# Patient Record
Sex: FEMALE | Race: WHITE | URBAN - METROPOLITAN AREA
[De-identification: names, ages, dates, MRNs, and addresses within clinical notes are randomized per-mention and may not be internally consistent; named-entity substitution may affect disease eponyms.]

---

## 2017-03-13 ENCOUNTER — TRANSFERRED RECORDS (OUTPATIENT)
Dept: HEALTH INFORMATION MANAGEMENT | Facility: CLINIC | Age: 77
End: 2017-03-13

## 2017-03-14 ENCOUNTER — PRE VISIT (OUTPATIENT)
Dept: OPHTHALMOLOGY | Facility: CLINIC | Age: 77
End: 2017-03-14

## 2017-03-14 RX ORDER — IBANDRONATE SODIUM 150 MG/1
150 TABLET, FILM COATED ORAL
COMMUNITY

## 2017-03-14 RX ORDER — PREDNISOLONE ACETATE 10 MG/ML
1 SUSPENSION/ DROPS OPHTHALMIC 4 TIMES DAILY
Refills: 1 | COMMUNITY
Start: 2017-01-04 | End: 2017-05-03

## 2017-03-14 RX ORDER — TRAMADOL HYDROCHLORIDE 50 MG/1
1 TABLET ORAL DAILY
Refills: 0 | COMMUNITY
Start: 2017-03-01 | End: 2017-04-10

## 2017-03-14 NOTE — TELEPHONE ENCOUNTER
Consult Referred by Dr Hernandez  For the evaluation of   Chief Complaint   Patient presents with     Previsit     appt w/ Dr Oglesby     Dermatochalasis Evaluation     referred by Dr Hernandez     Ectropion Evaluation     referred by Dr Hernandez            HPI:  Location:   OU     Symptoms:   POSITIVE for: and dermatochalasis and ectropian OU   Pertinent Negatives:   Negative for vision changes or eye problems  Severity:   moderate  Duration:   years    Timing:   gradual onset  Other:     POH:  1- Dry eye  2- Dermatochalasis  3- Ectropion     Ocular Meds:  Pred forte 1%  (Prednisilone), Xiidra, Refresh PM gel       ROS:    Review Of Systems  Eyes: positive as above, dermatochalasis and ectropian OU   Endocrine: positive for positive for     Allergies:    Allergies   Allergen Reactions     Erythromycin Nausea     Oral only     Penicillins Nausea     Tetracycline Nausea       Systemic Medications:   Current Outpatient Prescriptions   Medication     omeprazole (PRILOSEC) 20 MG CR capsule     traMADol (ULTRAM) 50 MG tablet     prednisoLONE acetate (PRED FORTE) 1 % ophthalmic susp     Lifitegrast (XIIDRA) 5 % SOLN     Carboxymethylcellul-Glycerin (REFRESH OPTIVE) 1-0.9 % GEL     aspirin 81 MG tablet     Cholecalciferol (VITAMIN D PO)     Ascorbic Acid (VITAMIN C PO)     CALCIUM PO     IBANdronate (BONIVA) 150 MG tablet     Docusate Sodium (COLACE PO)     No current facility-administered medications for this visit.        Family History   Problem Relation Age of Onset     Brain Cancer Mother      Breast Cancer Sister      DIABETES Sister      Glaucoma No family hx of      Macular Degeneration No family hx of      Eye Surgery No family hx of      Retinal detachment No family hx of      Nystagmus No family hx of        Social History   Substance Use Topics     Smoking status: Current Some Day Smoker     Packs/day: 0.50     Types: Cigarettes     Smokeless tobacco: Not on file      Comment: maybe 3 per day     Alcohol use No        Rubia SAUL.

## 2017-03-15 ENCOUNTER — OFFICE VISIT (OUTPATIENT)
Dept: OPHTHALMOLOGY | Facility: CLINIC | Age: 77
End: 2017-03-15
Payer: COMMERCIAL

## 2017-03-15 ENCOUNTER — TELEPHONE (OUTPATIENT)
Dept: OPHTHALMOLOGY | Facility: CLINIC | Age: 77
End: 2017-03-15

## 2017-03-15 DIAGNOSIS — H02.836 DERMATOCHALASIS OF EYELIDS OF BOTH EYES: Primary | ICD-10-CM

## 2017-03-15 DIAGNOSIS — H16.213 EXPOSURE KERATOCONJUNCTIVITIS, BILATERAL: ICD-10-CM

## 2017-03-15 DIAGNOSIS — H02.133: ICD-10-CM

## 2017-03-15 DIAGNOSIS — H02.136 SENILE ECTROPION, LEFT: ICD-10-CM

## 2017-03-15 DIAGNOSIS — H02.423 MYOGENIC PTOSIS, BILATERAL: ICD-10-CM

## 2017-03-15 DIAGNOSIS — H02.833 DERMATOCHALASIS OF EYELIDS OF BOTH EYES: Primary | ICD-10-CM

## 2017-03-15 PROCEDURE — 92285 EXTERNAL OCULAR PHOTOGRAPHY: CPT | Performed by: OPHTHALMOLOGY

## 2017-03-15 PROCEDURE — 99203 OFFICE O/P NEW LOW 30 MIN: CPT | Performed by: OPHTHALMOLOGY

## 2017-03-15 PROCEDURE — 92081 LIMITED VISUAL FIELD XM: CPT | Performed by: OPHTHALMOLOGY

## 2017-03-15 ASSESSMENT — TONOMETRY
OD_IOP_MMHG: 14
IOP_METHOD: ICARE
OS_IOP_MMHG: 12

## 2017-03-15 ASSESSMENT — VISUAL ACUITY
CORRECTION_TYPE: GLASSES
OS_CC: 20/25
OD_CC: 20/25
METHOD: SNELLEN - LINEAR

## 2017-03-15 ASSESSMENT — MARGIN REFLEX DISTANCE
OS_MRD1: 1
OD_MRD1: 2

## 2017-03-15 ASSESSMENT — LAGOPHTHALMOS
OD_LAGOPHTHALMOS: 0
OS_LAGOPHTHALMOS: 0

## 2017-03-15 ASSESSMENT — LEVATOR FUNCTION
OS_LEVATOR: 13
OD_LEVATOR: 13

## 2017-03-15 NOTE — LETTER
3/15/2017         RE:  :  MRN: Jayna Gutierrez  1940  [id number]     Dear Dr. Hernandez,    Thank you for allow me to see your patient, Jayna Gutierrez, for an oculoplastic consultation.  My assessment and plan are below.  For further details, please see my attached clinic note.               HPI:     Jayna Gutierrez is a 77 year old female who has noted gradual onset of droopy eyelids over the past years. The droopy eyelid is interfering with activities of daily living including driving, and reading. Additionally she has severe dry eyes, and she is using numerous drops, including artificial tears, ointment at night, steroid drops, and recently Xiidra. She is extremely light sensitive because of the dry eye. When she takes a shower she cannot run water on her face because it gets into her eye as she cannot squeeze the lower lids enough. The patient denies double vision, variability of the eyelid position.     EXAM:     MRD1: 2/1  Dermatochalasis with excess skin touching eyelashes Significant Myogenic ptosis.   Lower eyelid laxity, with poor apposition of the lower eyelid to the globe, left worse than right.       VISUAL FIELD:  Right eye untaped:10 degrees Right eye taped:45 degrees  Left eye untaped:10 degrees Left eye taped:60 degrees    Assessment & Plan     Jayna Gutierrez is a 77 year old female with the following diagnoses:   1. Dermatochalasis of eyelids of both eyes    2. Myogenic ptosis, bilateral    3. Senile ectropion, left    4. Senile ectropion of right eye    5. Exposure keratoconjunctivitis, bilateral           Both upper eyelid blepharoplasty  and Bilateral ptosis repair external levator approach (conservative given dry eye), both lower eyelid ectropion repair (tarsal strip).      ANTICOAGULATION:    Can hold prior to surgery: ASA        Again, thank you for allowing me to participate in the care of your patient.      Sincerely,    Poornima Oglesby MD  Department of Ophthalmology and  Visual Neurosciences  [facility]    CC: Laurence Hernandez  [facility]  [address] [facility] [address]  VIA Mail

## 2017-03-15 NOTE — PROGRESS NOTES
Oculoplastic Clinic New Patient    Patient: Jayna Gutierrez MRN# [id number]   YOB: 1940 Age: 77 year old   Date of Visit: Mar 15, 2017    CC: Droopy eyelids obstructing vision.  Chief Complaints and History of Present Illnesses   Patient presents with     Dermatochalasis Evaluation     refered by Dr Hernandez                 HPI:     Jayna Gutierrez is a 77 year old female who has noted gradual onset of droopy eyelids over the past years. The droopy eyelid is interfering with activities of daily living including driving, and reading. Additionally she has severe dry eyes, and she is using numerous drops, including artificial tears, ointment at night, steroid drops, and recently Xiidra. She is extremely light sensitive because of the dry eye. When she takes a shower she cannot run water on her face because it gets into her eye as she cannot squeeze the lower lids enough. The patient denies double vision, variability of the eyelid position.     EXAM:     MRD1: 2/1  Dermatochalasis with excess skin touching eyelashes Significant Myogenic ptosis.   Lower eyelid laxity, with poor apposition of the lower eyelid to the globe, left worse than right.       VISUAL FIELD:  Right eye untaped:10 degrees Right eye taped:45 degrees  Left eye untaped:10 degrees Left eye taped:60 degrees    Assessment & Plan     Jayna Gutierrez is a 77 year old female with the following diagnoses:   1. Dermatochalasis of eyelids of both eyes    2. Myogenic ptosis, bilateral    3. Senile ectropion, left    4. Senile ectropion of right eye    5. Exposure keratoconjunctivitis, bilateral           Both upper eyelid blepharoplasty  and Bilateral ptosis repair external levator approach (conservative given dry eye), both lower eyelid ectropion repair (tarsal strip).      ANTICOAGULATION:    Can hold prior to surgery: ASA         PHOTOS DEMONSTRATE:    Significant dermatochalasis with lids resting on eyelashes and obstructing visual  axis  Myogenic blepharoptosis  Both lower eyelid ectropion contributing to her exposure keratoconjunctivitis.     Complete documentation of historical and exam elements from today's encounter can be found in the full encounter summary report (not reduplicated in this progress note). I personally obtained the chief complaint(s) and history of present illness.  I confirmed and edited as necessary the review of systems, past medical/surgical history, family history, social history, and examination findings as documented by others; and I examined the patient myself. I personally reviewed the relevant tests, images, and reports as documented above. I formulated and edited as necessary the assessment and plan and discussed the findings and management plan with the patient and family. - Poornima Oglesby MD      Today with Jayna Gutierrez, I reviewed the indications, risks, benefits, and alternatives of the proposed surgical procedure including, but not limited to, failure obtain the desired result  and need for additional surgery, bleeding, infection, loss of vision, loss of the eye, and the remote possibility of permanent damage to any organ system or death with the use of anesthesia.  I provided multiple opportunities for the questions, answered all questions to the best of my ability, and confirmed that my answers and my discussion were understood.

## 2017-03-15 NOTE — NURSING NOTE
Patient presents with:  Dermatochalasis Evaluation: refered by Dr Hernandez      Referring Provider:  Laurence Hernandez  [facility]  [id number] [place] [id number]    HPI    Affected eye(s):  Both   Location:  Upper   Symptoms:     Difficulty watching television   Difficulty with driving      Frequency:  Constant, Daily       Do you have eye pain now?:  No      Comments:     Dermatochalasis Evaluation: refered by Dr Hernandez

## 2017-03-17 NOTE — TELEPHONE ENCOUNTER
PA sent to [facility]. I will call the patient once we have a determination.  Eli Pinto, Surgery Scheduling Coordinator

## 2017-03-20 NOTE — TELEPHONE ENCOUNTER
No PA required per are. Fax sent to [facility] for [facility].        Date Scheduled: 4-10-17  Facility: [facility]  Surgeon: Dr. Oglesby   Post-op appointment scheduled:   Next 5 appointments (look out 90 days)     Apr 26, 2017 12:30 PM CDT   Return Visit with Poornima Oglesby MD   [facility] ([facility])    [address] [place] [id number] [phone]                   scheduled?: No  Surgery packet/instructions confirmed received?  Yes, mailed  Special Considerations:   Eli Pinto, Surgery Scheduling Coordinator

## 2017-04-07 ENCOUNTER — ANESTHESIA EVENT (OUTPATIENT)
Dept: SURGERY | Facility: AMBULATORY SURGERY CENTER | Age: 77
End: 2017-04-07

## 2017-04-07 NOTE — PROGRESS NOTES
Chart reviewed by Dr. Andino as requested. OK for surgery at [facility].

## 2017-04-10 ENCOUNTER — ANESTHESIA (OUTPATIENT)
Dept: SURGERY | Facility: AMBULATORY SURGERY CENTER | Age: 77
End: 2017-04-10

## 2017-04-10 ENCOUNTER — SURGERY (OUTPATIENT)
Age: 77
End: 2017-04-10
Payer: COMMERCIAL

## 2017-04-10 ENCOUNTER — HOSPITAL ENCOUNTER (OUTPATIENT)
Facility: AMBULATORY SURGERY CENTER | Age: 77
Discharge: HOME | End: 2017-04-10
Attending: OPHTHALMOLOGY | Admitting: OPHTHALMOLOGY
Payer: COMMERCIAL

## 2017-04-10 ENCOUNTER — TELEPHONE (OUTPATIENT)
Dept: OPHTHALMOLOGY | Facility: CLINIC | Age: 77
End: 2017-04-10

## 2017-04-10 VITALS
BODY MASS INDEX: 17.93 KG/M2 | OXYGEN SATURATION: 96 % | HEIGHT: 64 IN | RESPIRATION RATE: 16 BRPM | DIASTOLIC BLOOD PRESSURE: 64 MMHG | SYSTOLIC BLOOD PRESSURE: 149 MMHG | WEIGHT: 105 LBS | TEMPERATURE: 97 F

## 2017-04-10 DIAGNOSIS — Z98.890 POSTOPERATIVE EYE STATE: Primary | ICD-10-CM

## 2017-04-10 PROCEDURE — G8907 PT DOC NO EVENTS ON DISCHARG: HCPCS

## 2017-04-10 PROCEDURE — 67904 REPAIR EYELID DEFECT: CPT | Mod: E3

## 2017-04-10 PROCEDURE — 67917 REPAIR EYELID DEFECT: CPT | Mod: E4

## 2017-04-10 PROCEDURE — G8918 PT W/O PREOP ORDER IV AB PRO: HCPCS

## 2017-04-10 PROCEDURE — 67904 REPAIR EYELID DEFECT: CPT | Mod: 50 | Performed by: OPHTHALMOLOGY

## 2017-04-10 PROCEDURE — 68801 DILATE TEAR DUCT OPENING: CPT | Mod: 51 | Performed by: OPHTHALMOLOGY

## 2017-04-10 PROCEDURE — 67917 REPAIR EYELID DEFECT: CPT | Mod: 51 | Performed by: OPHTHALMOLOGY

## 2017-04-10 RX ORDER — SODIUM CHLORIDE, SODIUM LACTATE, POTASSIUM CHLORIDE, CALCIUM CHLORIDE 600; 310; 30; 20 MG/100ML; MG/100ML; MG/100ML; MG/100ML
INJECTION, SOLUTION INTRAVENOUS CONTINUOUS
Status: DISCONTINUED | OUTPATIENT
Start: 2017-04-10 | End: 2017-04-11 | Stop reason: HOSPADM

## 2017-04-10 RX ORDER — FENTANYL CITRATE 50 UG/ML
INJECTION, SOLUTION INTRAMUSCULAR; INTRAVENOUS PRN
Status: DISCONTINUED | OUTPATIENT
Start: 2017-04-10 | End: 2017-04-10

## 2017-04-10 RX ORDER — TRAMADOL HYDROCHLORIDE 50 MG/1
50 TABLET ORAL EVERY 8 HOURS PRN
Qty: 20 TABLET | Refills: 0 | Status: SHIPPED | OUTPATIENT
Start: 2017-04-10

## 2017-04-10 RX ORDER — LIDOCAINE HYDROCHLORIDE 20 MG/ML
INJECTION, SOLUTION INFILTRATION; PERINEURAL PRN
Status: DISCONTINUED | OUTPATIENT
Start: 2017-04-10 | End: 2017-04-10

## 2017-04-10 RX ORDER — LIDOCAINE 40 MG/G
CREAM TOPICAL
Status: DISCONTINUED | OUTPATIENT
Start: 2017-04-10 | End: 2017-04-11 | Stop reason: HOSPADM

## 2017-04-10 RX ORDER — BACITRACIN 500 [USP'U]/G
OINTMENT OPHTHALMIC PRN
Status: DISCONTINUED | OUTPATIENT
Start: 2017-04-10 | End: 2017-04-10 | Stop reason: HOSPADM

## 2017-04-10 RX ORDER — ALBUTEROL SULFATE 0.83 MG/ML
2.5 SOLUTION RESPIRATORY (INHALATION) EVERY 4 HOURS PRN
Status: DISCONTINUED | OUTPATIENT
Start: 2017-04-10 | End: 2017-04-11 | Stop reason: HOSPADM

## 2017-04-10 RX ORDER — ONDANSETRON 2 MG/ML
INJECTION INTRAMUSCULAR; INTRAVENOUS PRN
Status: DISCONTINUED | OUTPATIENT
Start: 2017-04-10 | End: 2017-04-10

## 2017-04-10 RX ORDER — ONDANSETRON 4 MG/1
4 TABLET, ORALLY DISINTEGRATING ORAL EVERY 30 MIN PRN
Status: DISCONTINUED | OUTPATIENT
Start: 2017-04-10 | End: 2017-04-11 | Stop reason: HOSPADM

## 2017-04-10 RX ORDER — ERYTHROMYCIN 5 MG/G
OINTMENT OPHTHALMIC
Qty: 3.5 G | Refills: 0 | Status: SHIPPED | OUTPATIENT
Start: 2017-04-10 | End: 2017-04-12

## 2017-04-10 RX ORDER — TETRACAINE HYDROCHLORIDE 5 MG/ML
SOLUTION OPHTHALMIC PRN
Status: DISCONTINUED | OUTPATIENT
Start: 2017-04-10 | End: 2017-04-10 | Stop reason: HOSPADM

## 2017-04-10 RX ORDER — FENTANYL CITRATE 50 UG/ML
25-50 INJECTION, SOLUTION INTRAMUSCULAR; INTRAVENOUS
Status: DISCONTINUED | OUTPATIENT
Start: 2017-04-10 | End: 2017-04-11 | Stop reason: HOSPADM

## 2017-04-10 RX ORDER — ONDANSETRON 2 MG/ML
4 INJECTION INTRAMUSCULAR; INTRAVENOUS EVERY 30 MIN PRN
Status: DISCONTINUED | OUTPATIENT
Start: 2017-04-10 | End: 2017-04-11 | Stop reason: HOSPADM

## 2017-04-10 RX ORDER — HYDROMORPHONE HYDROCHLORIDE 1 MG/ML
.3-.5 INJECTION, SOLUTION INTRAMUSCULAR; INTRAVENOUS; SUBCUTANEOUS EVERY 10 MIN PRN
Status: DISCONTINUED | OUTPATIENT
Start: 2017-04-10 | End: 2017-04-11 | Stop reason: HOSPADM

## 2017-04-10 RX ORDER — DEXAMETHASONE SODIUM PHOSPHATE 4 MG/ML
4 INJECTION, SOLUTION INTRA-ARTICULAR; INTRALESIONAL; INTRAMUSCULAR; INTRAVENOUS; SOFT TISSUE EVERY 10 MIN PRN
Status: DISCONTINUED | OUTPATIENT
Start: 2017-04-10 | End: 2017-04-11 | Stop reason: HOSPADM

## 2017-04-10 RX ORDER — NEOMYCIN SULFATE, POLYMYXIN B SULFATE AND DEXAMETHASONE 3.5; 10000; 1 MG/ML; [USP'U]/ML; MG/ML
SUSPENSION/ DROPS OPHTHALMIC
Qty: 1 BOTTLE | Refills: 0 | Status: SHIPPED | OUTPATIENT
Start: 2017-04-10 | End: 2017-05-03

## 2017-04-10 RX ORDER — HYDROCODONE BITARTRATE AND ACETAMINOPHEN 5; 325 MG/1; MG/1
1 TABLET ORAL EVERY 6 HOURS PRN
Qty: 15 TABLET | Refills: 0 | Status: SHIPPED | OUTPATIENT
Start: 2017-04-10 | End: 2017-05-03

## 2017-04-10 RX ORDER — PROPOFOL 10 MG/ML
INJECTION, EMULSION INTRAVENOUS CONTINUOUS PRN
Status: DISCONTINUED | OUTPATIENT
Start: 2017-04-10 | End: 2017-04-10

## 2017-04-10 RX ORDER — PROPOFOL 10 MG/ML
INJECTION, EMULSION INTRAVENOUS PRN
Status: DISCONTINUED | OUTPATIENT
Start: 2017-04-10 | End: 2017-04-10

## 2017-04-10 RX ORDER — NALOXONE HYDROCHLORIDE 0.4 MG/ML
.1-.4 INJECTION, SOLUTION INTRAMUSCULAR; INTRAVENOUS; SUBCUTANEOUS
Status: DISCONTINUED | OUTPATIENT
Start: 2017-04-10 | End: 2017-04-11 | Stop reason: HOSPADM

## 2017-04-10 RX ORDER — HYDROCODONE BITARTRATE AND ACETAMINOPHEN 5; 325 MG/1; MG/1
1 TABLET ORAL
Status: DISCONTINUED | OUTPATIENT
Start: 2017-04-10 | End: 2017-04-11 | Stop reason: HOSPADM

## 2017-04-10 RX ORDER — MEPERIDINE HYDROCHLORIDE 25 MG/ML
12.5 INJECTION INTRAMUSCULAR; INTRAVENOUS; SUBCUTANEOUS
Status: DISCONTINUED | OUTPATIENT
Start: 2017-04-10 | End: 2017-04-11 | Stop reason: HOSPADM

## 2017-04-10 RX ADMIN — FENTANYL CITRATE 50 MCG: 50 INJECTION, SOLUTION INTRAMUSCULAR; INTRAVENOUS at 08:24

## 2017-04-10 RX ADMIN — ONDANSETRON 4 MG: 2 INJECTION INTRAMUSCULAR; INTRAVENOUS at 08:30

## 2017-04-10 RX ADMIN — LIDOCAINE HYDROCHLORIDE 40 MG: 20 INJECTION, SOLUTION INFILTRATION; PERINEURAL at 08:15

## 2017-04-10 RX ADMIN — PROPOFOL 10 MG: 10 INJECTION, EMULSION INTRAVENOUS at 08:19

## 2017-04-10 RX ADMIN — PROPOFOL 10 MG: 10 INJECTION, EMULSION INTRAVENOUS at 08:24

## 2017-04-10 RX ADMIN — FENTANYL CITRATE 50 MCG: 50 INJECTION, SOLUTION INTRAMUSCULAR; INTRAVENOUS at 08:12

## 2017-04-10 RX ADMIN — PROPOFOL 75 MCG/KG/MIN: 10 INJECTION, EMULSION INTRAVENOUS at 08:15

## 2017-04-10 RX ADMIN — Medication 8.75 ML: at 08:28

## 2017-04-10 RX ADMIN — BACITRACIN 0.25 TUBE: 500 OINTMENT OPHTHALMIC at 08:42

## 2017-04-10 RX ADMIN — SODIUM CHLORIDE, SODIUM LACTATE, POTASSIUM CHLORIDE, CALCIUM CHLORIDE: 600; 310; 30; 20 INJECTION, SOLUTION INTRAVENOUS at 07:45

## 2017-04-10 RX ADMIN — PROPOFOL 10 MG: 10 INJECTION, EMULSION INTRAVENOUS at 08:27

## 2017-04-10 RX ADMIN — TETRACAINE HYDROCHLORIDE 2 DROP: 5 SOLUTION OPHTHALMIC at 08:15

## 2017-04-10 ASSESSMENT — LIFESTYLE VARIABLES: TOBACCO_USE: 1

## 2017-04-10 NOTE — ANESTHESIA PREPROCEDURE EVALUATION
Anesthesia Evaluation     . Pt has had prior anesthetic.     No history of anesthetic complications          ROS/MED HX    ENT/Pulmonary:  - neg pulmonary ROS   (+)tobacco use, Past use , . .    Neurologic:  - neg neurologic ROS     Cardiovascular: Comment: Thoracic AAA        METS/Exercise Tolerance:     Hematologic:  - neg hematologic  ROS       Musculoskeletal:  - neg musculoskeletal ROS       GI/Hepatic:     (+) GERD       Renal/Genitourinary:     (+) Nephrolithiasis ,       Endo:  - neg endo ROS       Psychiatric:  - neg psychiatric ROS       Infectious Disease:  - neg infectious disease ROS       Malignancy:      - no malignancy   Other:    (+) H/O Chronic Pain,  - neg other ROS                 Physical Exam  Normal systems: cardiovascular and pulmonary    Airway   Mallampati: II  TM distance: >3 FB  Neck ROM: full    Dental   (+) upper dentures    Cardiovascular       Pulmonary                     Anesthesia Plan      History & Physical Review  History and physical reviewed and following examination; no interval change.    ASA Status:  2 .    NPO Status:  > 8 hours    Plan for MAC with Intravenous induction. Maintenance will be TIVA.  Reason for MAC:  Procedure to face, neck, head or breast  PONV prophylaxis:  Ondansetron (or other 5HT-3)       Postoperative Care  Postoperative pain management:  IV analgesics and Oral pain medications.      Consents  Anesthetic plan, risks, benefits and alternatives discussed with:  Patient..                          .

## 2017-04-10 NOTE — TELEPHONE ENCOUNTER
[facility] Call Center    Phone Message    Name of Caller: Flower    Phone Number: Home number on file [phone]     Best time to return call: Any    May a detailed message be left on voicemail: yes    Relation to patient: Other Name: Flower  Relationship: Daughter  Is there legal documentation in chart to discuss information with this person: yes 6/2014    Reason for Call: Flower called and said Dr. Noguera had offered a medication for pain management that Jayna declined.  Flower said Jayna is more painful than she anticipated and she would like the option of the additional pain management.  Please advise.  Flower said she can come back and pick the medication up.  Requesting a call to discuss.  Thank you.    Action Taken: Message routed to:  Adult Clinics: Eye p [phone]

## 2017-04-10 NOTE — TELEPHONE ENCOUNTER
Pt did not want to have Vicodin due to a colon problem. But now she would like to the stronger med due the tramadol not relieving pain.   Rx written and kike waiting to be picked up.  Rubia SAUL.

## 2017-04-10 NOTE — IP AVS SNAPSHOT
[facility]    [address] [place] [address]    Phone:  [phone]                                       After Visit Summary   4/10/2017    Jayna Gutierrez    MRN: [id number]           After Visit Summary Signature Page     I have received my discharge instructions, and my questions have been answered. I have discussed any challenges I see with this plan with the nurse or doctor.    ..........................................................................................................................................  Patient/Patient Representative Signature      ..........................................................................................................................................  Patient Representative Print Name and Relationship to Patient    ..................................................               ................................................  Date                                            Time    ..........................................................................................................................................  Reviewed by Signature/Title    ...................................................              ..............................................  Date                                                            Time

## 2017-04-10 NOTE — ANESTHESIA POSTPROCEDURE EVALUATION
Patient: Jayna Gutierrez    Procedure(s):  Both upper eyelid blepharoplasty and ptosis repair, both lower eyelid ectropion - Wound Class: I-Clean   - Wound Class: I-Clean    Diagnosis:droopy eyelids, and ectropion  Diagnosis Additional Information: No value filed.    Anesthesia Type:  MAC    Note:  Anesthesia Post Evaluation    Patient location during evaluation: PACU  Patient participation: Able to fully participate in evaluation  Level of consciousness: awake  Pain management: adequate  Airway patency: patent  Cardiovascular status: acceptable  Respiratory status: acceptable  Hydration status: balanced  PONV: none     Anesthetic complications: None          Last vitals:  Vitals:    04/10/17 0741 04/10/17 0923 04/10/17 0940   BP: 150/88 126/64 149/64   Resp: 16 16 16   Temp: 37.1  C (98.7  F) 35.7  C (96.2  F) 36.1  C (97  F)   SpO2: 97% 97% 96%         Electronically Signed By: Jayant Diana MD  April 10, 2017  4:07 PM

## 2017-04-10 NOTE — OP NOTE
PREOPERATIVE DIAGNOSIS: Ptosis, bilateral upper lid. Dermatochalasis, bilateral upper lid. Ectropion both lower lids.   POSTOPERATIVE DIAGNOSIS:  Ptosis, bilateral upper lid. Dermatochalasis, bilateral upper lid. Ectropion both lower lids.   PROCEDURE:Bilateral  upper eyelid  ptosis repair by external levator resection. Bilateral upper eyelid blepharoplasty. Both lower eyelid ectropion repair.   SURGEON: Poornima Oglesby MD  ASSISTANT: none  ANESTHESIA: Monitored with local infiltration of a 50/50 mixture of 2% lidocaine with epinephrine and 0.5% Marcaine.   COMPLICATIONS: None.   ESTIMATED BLOOD LOSS: Less than 5 mL.   HISTORY: Jayna Gutierrez  presented with ptosis of bilateral upper lid interfering with the superior visual field and activities of daily living. Additionally she had both lowe eyelid ectropion. After the risks, benefits and alternatives to the proposed procedure were explained, informed consent was obtained.   DESCRIPTION OF PROCEDURE: Jayna Gutierrez  was brought to the operating room and placed supine on the operating table. Intravenous sedation was given. The bilateral upper lid crease and an ellipse of skin to excised was marked with a marking pen and infiltrated with local anesthetic. The lower lid and lateral canthal areas were also infiltrated with local anesthetic.  The area was prepped and draped in the typical sterile ophthalmic fashion. Attention was directed to the right  side. The blepharoplasty incision which had been previously marked was incised with a 15 blade and dissection carried down to the orbicularis with high temperature cautery. The orbital septum was opened horizontally. The levator aponeurosis was identified and dissected from the superior tarsal border and the underlying Cruz's muscle and advanced with a 5-0 Mersilene suture to bring the lid into a normal height and contour. The suture was passed to partial thickness through the superior tarsal plate and then each  end brought underneath the levator aponeurosis. The patient was asked to open the eye and the suture adjusted for height and contour.  The skin was closed with with running 6-0 plain gut sutures.  An 8 mm lateral canthal incision was made with a 15 blade and dissection carried down to the orbicularis with high temperature cautery. Lateral canthotomy and inferior cantholysis was performed with the cautery. A lateral tarsal strip was fashioned with the high temperature cautery and the Winston scissors. There was a lateral lower lid chalazion on each lower lid, and this was also opened and drained. The tarsal strip was secured to the lateral orbital rim periosteum with a double-armed 5-0 vicryl suture in a horizontal mattress fashion. Lateral canthal angle was closed with a gray line to gray line suture of 5-0 Vicryl tied internally. Skin was closed with interrupted 6-0 plain gut suture. Ophthalmic ointment was applied to the incision. Attention was directed to the left side where the same procedure was performed.    The patient tolerated the procedure well and left the operating room in stable condition.     Poornima Oglesby MD

## 2017-04-10 NOTE — DISCHARGE INSTRUCTIONS
[facility]  Same-Day Surgery   Adult Discharge Orders & Instructions   For 24 hours after surgery  1. Get plenty of rest.  A responsible adult must stay with you for at least 24 hours after you leave the hospital.   2. Do not drive or use heavy equipment.  If you have weakness or tingling, don't drive or use heavy equipment until this feeling goes away.  3. Do not drink alcohol.  4. Avoid strenuous or risky activities.  Ask for help when climbing stairs.   5. You may feel lightheaded.  IF so, sit for a few minutes before standing.  Have someone help you get up.   6. If you have nausea (feel sick to your stomach): Drink only clear liquids such as apple juice, ginger ale, broth or 7-Up.  Rest may also help.  Be sure to drink enough fluids.  Move to a regular diet as you feel able.  7. You may have a slight fever. Call the doctor if your fever is over 100 F (37.7 C) (taken under the tongue) or lasts longer than 24 hours.  8. You may have a dry mouth, a sore throat, muscle aches or trouble sleeping.  These should go away after 24 hours.  9. Do not make important or legal decisions.   Call your doctor for any of the followin.  Signs of infection (fever, growing tenderness at the surgery site, a large amount of drainage or bleeding, severe pain, foul-smelling drainage, redness, swelling).    2. It has been over 8 to 10 hours since surgery and you are still not able to urinate (pass water).    3.  Headache for over 24 hours.    4.  Numbness, tingling or weakness the day after surgery (if you had spinal anesthesia).  To contact a doctor, call: [phone]     Post-operative Instructions  Ophthalmic Plastic and Reconstructive Surgery    Poornima Oglesby M.D.     All instructions apply to the operated eye(s) or eyelid(s).    Wound care and personal care  ? If a patch or bandage has been placed, please leave this in place until seen by your physician. Ensure that the bandage does not get wet  when you take a shower.  ? Apply ice compresses 15 minutes on 15 minutes off while awake for 2 days, then switch to warm water compresses 4 times a day until seen by your physician. For warm packs you can place a cup of dry uncooked rice in a clean cotton sock. Then place sock in microwave 30 seconds to one minute. Next place the warm sock into a plastic bag and wrap the bag with clean warm wet washcloth and place over operated eye.    ? You may shower or wash your hair the day after surgery. Do not bathe or go swimming for 1 week to prevent contamination of your wounds.  ? Do not apply make-up to the eyes or eyelids for 2 weeks after surgery.  ? Expect some swelling, bruising, black eye (even into the lower eyelids and cheeks). Also expect serum caking, crusting and discharge from the eye and/or incisions. A small amount of surface bleeding is normal for the first 48 hours.  ? Your eye(s) and eyelid(s) may be painful and tender. This is normal after surgery.  Use the pain medication as prescribed. If your pain does not improve despite the  medication, contact the office.    Contact information and follow-up  ? Return to the Eye Clinic for a follow-up appointment with your physician as  scheduled. If no appointment has been scheduled:   - [facility] eye clinic: [phone] for an appointment with Dr. Oglesby within 1 to 2 weeks from your date of surgery.   -  [facility] eye clinic: [phone] for an appointment with Dr. Oglesby within 1 to 2 weeks from your date of surgery.     ? For severe pain, bleeding, or loss of vision, call the [facility] at [phone] or [facility] at [phone].     After hours or on weekends and holidays, call [phone] and ask to speak with the ophthalmologist on call.    An on call person can be reached after hours for concerns. The on call doctor should not call in medication refill requests after hours or  on weekends, so please plan accordingly. An effort has been made to provide adequate pain medications following every surgery, and refills will not be provided in most instances. Narcotic pain medications cannot be called in.     Activity restrictions and driving  ? Avoid heavy lifting, bending, exercise or strenuous activity for 1 week after surgery.  You may resume other activities and return to work as tolerated.  ? You may not resume driving until have you stopped using narcotic pain medications (such as Norco, Percocet, Tylenol #3).    Medications  ? Restart all your regular home medications and eye drops. If you take Plavix or  Aspirin on a regular basis, wait for 72 hours after your surgery before restarting these in order to decrease the risk of bleeding complications.  ? Avoid aspirin and aspirin-like medications (Motrin, Aleve, Ibuprofen, Leti-  [place] etc) for 72 hours to reduce the risk of bleeding. You may take Tylenol  (acetaminophen) for pain.  ? In addition to your home medications, take the following post-operative medications as prescribed by your physician.    ? Apply antibiotic ointment to all sutures three times a day, and into the operated eye(s) at night.  ? Instill eye drops 3 times a day for 10 days.   ? Take pain pills at prescribed frequency as needed for pain.

## 2017-04-10 NOTE — ANESTHESIA CARE TRANSFER NOTE
Patient: Jayna Gutierrez    Procedure(s):  Both upper eyelid blepharoplasty and ptosis repair, both lower eyelid ectropion - Wound Class: I-Clean   - Wound Class: I-Clean    Diagnosis: droopy eyelids, and ectropion  Diagnosis Additional Information: No value filed.    Anesthesia Type:   MAC     Note:  Airway :Room Air  Patient transferred to:Phase II        Vitals: (Last set prior to Anesthesia Care Transfer)    CRNA VITALS  4/10/2017 0849 - 4/10/2017 0923      4/10/2017             SpO2: 94 %                Electronically Signed By: DOYLE Houser CRNA  April 10, 2017  9:23 AM

## 2017-04-11 ENCOUNTER — TELEPHONE (OUTPATIENT)
Dept: NURSING | Facility: CLINIC | Age: 77
End: 2017-04-11

## 2017-04-12 ENCOUNTER — OFFICE VISIT (OUTPATIENT)
Dept: OPHTHALMOLOGY | Facility: CLINIC | Age: 77
End: 2017-04-12
Payer: COMMERCIAL

## 2017-04-12 ENCOUNTER — TELEPHONE (OUTPATIENT)
Dept: NURSING | Facility: CLINIC | Age: 77
End: 2017-04-12

## 2017-04-12 ENCOUNTER — TELEPHONE (OUTPATIENT)
Dept: OPHTHALMOLOGY | Facility: CLINIC | Age: 77
End: 2017-04-12

## 2017-04-12 DIAGNOSIS — H02.834 DERMATOCHALASIS OF BOTH UPPER EYELIDS: Primary | ICD-10-CM

## 2017-04-12 DIAGNOSIS — H02.421 MYOGENIC PTOSIS, RIGHT: ICD-10-CM

## 2017-04-12 DIAGNOSIS — H02.831 DERMATOCHALASIS OF BOTH UPPER EYELIDS: Primary | ICD-10-CM

## 2017-04-12 DIAGNOSIS — H02.102 ECTROPION OF RIGHT LOWER EYELID: ICD-10-CM

## 2017-04-12 DIAGNOSIS — H02.105 ECTROPION OF LEFT LOWER EYELID: ICD-10-CM

## 2017-04-12 DIAGNOSIS — H02.422 MYOGENIC PTOSIS, LEFT: ICD-10-CM

## 2017-04-12 DIAGNOSIS — Z98.890 POSTOPERATIVE EYE STATE: ICD-10-CM

## 2017-04-12 PROCEDURE — 99024 POSTOP FOLLOW-UP VISIT: CPT | Performed by: OPHTHALMOLOGY

## 2017-04-12 RX ORDER — ERYTHROMYCIN 5 MG/G
OINTMENT OPHTHALMIC
Qty: 3.5 G | Refills: 1 | Status: SHIPPED | OUTPATIENT
Start: 2017-04-12 | End: 2017-06-28

## 2017-04-12 NOTE — MR AVS SNAPSHOT
After Visit Summary   4/12/2017    Jayna Gutierrez    MRN: [id number]           Patient Information     Date Of Birth          1940        Visit Information        Provider Department      4/12/2017 8:00 AM Poornima Oglesby MD [facility]        Today's Diagnoses     Dermatochalasis of both upper eyelids    -  1    Postoperative eye state        Myogenic ptosis, right        Myogenic ptosis, left        Ectropion of right lower eyelid        Ectropion of left lower eyelid           Follow-ups after your visit        Your next 10 appointments already scheduled     Apr 26, 2017 12:30 PM CDT   Return Visit with Poornima Oglesby MD   [facility] ([facility])    [address] [address] [place] [id number] [phone]              Who to contact     If you have questions or need follow up information about today's clinic visit or your schedule please contact [facility] directly at [phone].  Normal or non-critical lab and imaging results will be communicated to you by NextCapitalhart, letter or phone within 4 business days after the clinic has received the results. If you do not hear from us within 7 days, please contact the clinic through BioDigitalt or phone. If you have a critical or abnormal lab result, we will notify you by phone as soon as possible.  Submit refill requests through [employer] or call your pharmacy and they will forward the refill request to us. Please allow 3 business days for your refill to be completed.          Additional Information About Your Visit        NextCapitalhart Information     [employer] is an electronic gateway that provides easy, online access to your medical records. With [employer], you can request a clinic appointment, read your test results, renew a prescription or communicate with your care team.     To sign up for [employer] visit the website at [email or web address]   You will be asked to enter the  access code listed below, as well as some personal information. Please follow the directions to create your username and password.     Your access code is: [email or web address]  Expires: 2017 11:58 AM     Your access code will  in 90 days. If you need help or a new code, please contact your [facility] or call [phone] for assistance.        Care EveryWhere ID     This is your Care EveryWhere ID. This could be used by other organizations to access your [facility] medical records  [id number]         Blood Pressure from Last 3 Encounters:   04/10/17 149/64    Weight from Last 3 Encounters:   17 47.6 kg (105 lb)              Today, you had the following     No orders found for display         Where to get your medicines      These medications were sent to [facility] [place] - [place], [place] - [address], [address]  [address], [address], [facility] [address]     Phone:  [phone]     erythromycin ophthalmic ointment          Primary Care Provider    None Specified       No primary provider on file.        Thank you!     Thank you for choosing [facility]  for your care. Our goal is always to provide you with excellent care. Hearing back from our patients is one way we can continue to improve our services. Please take a few minutes to complete the written survey that you may receive in the mail after your visit with us. Thank you!             Your Updated Medication List - Protect others around you: Learn how to safely use, store and throw away your medicines at www.disposemymeds.org.          This list is accurate as of: 17  8:23 AM.  Always use your most recent med list.                   Brand Name Dispense Instructions for use    aspirin 81 MG tablet      Take 81 mg by mouth daily       CALCIUM PO      Take 1 tablet by mouth daily       COLACE PO      Take 50 mg by mouth as needed for constipation       erythromycin  ophthalmic ointment    ROMYCIN    3.5 g    Apply small amount to incision sites three times a day       HYDROcodone-acetaminophen 5-325 MG per tablet    NORCO    15 tablet    Take 1 tablet by mouth every 6 hours as needed for moderate to severe pain       IBANdronate 150 MG tablet    BONIVA     Take 150 mg by mouth every 30 days       neomycin-polymyxin-dexamethasone 3.5-42628-9.1 Susp ophthalmic susp    MAXITROL    1 Bottle    One drop to operated eye(s) three times daily for 10 days.       OCUVITE PO      Take 1 capsule by mouth daily       omeprazole 20 MG CR capsule    priLOSEC     Take 1 capsule by mouth daily       prednisoLONE acetate 1 % ophthalmic susp    PRED FORTE     Place 1 drop into both eyes 4 times daily       REFRESH OPTIVE 1-0.9 % Gel   Generic drug:  Carboxymethylcellul-Glycerin      Apply 1 drop to eye At Bedtime       traMADol 50 MG tablet    ULTRAM    20 tablet    Take 1 tablet (50 mg) by mouth every 8 hours as needed for moderate pain       VITAMIN C PO          VITAMIN D PO      Take 1 tablet by mouth daily       XIIDRA 5 % Soln   Generic drug:  Lifitegrast      Apply 1 drop to eye 2 times daily

## 2017-04-12 NOTE — TELEPHONE ENCOUNTER
Jayna's daughter called in this morning wondering if she should be seen in clinic. Pt is 2 days s/p Both upper eyelid blepharoplasty and ptosis repair, both lower eyelid ectropion.  Recommended that she be seen this morning to make sure the sutures did not come out.  Pt on her way to clinic.  Rubia SAUL.

## 2017-04-12 NOTE — TELEPHONE ENCOUNTER
"Call Type: Triage Call    Presenting Problem: Patient reports recent eye surgery and states,  \"All of a sudden it just started bleeding and a big clot came out.\"  Patient reports that bleeding has stopped at this time.  She denies  any visual changes.  Patient reports that she will watch it and if  anything gets worse she will \"come in.\"  Advised patient that she  should be seen in an ER immediately with any sudden changes in  vision, seeing spots, severe pain, etc.  Additionally, advised  patient to call surgeon's office in the morning for further care  advice and symptoms to watch for.  Triage Note:  Guideline Title: Eye: Other Problems  Recommended Disposition: Provide Home/Self Care  Original Inclination: Wanted to speak with a nurse  Override Disposition:  Intended Action: Follow advice given  Physician Contacted: No  All other situations ?  YES  Feeling of dry eyes with stinging, burning, or strained vision ? NO  Bright red spot in white of eye following sneezing, minor trauma, or occurring  spontaneously ? NO  Eye injury, possible foreign body, OR chemical splash ? NO  Sudden or severe eye pain and no injury ? NO  Sudden vision change ? NO  Repetitive, uncontrollable twitching or spasms of the eyelid(s) ? NO  Gradual onset of decreased or absent blinking AND not previously evaluated ? NO  Repetitive, uncontrolled twitching or spasms of the eyelid(s) lasting more than 3  days AND not previously evaluated ? NO  Bulging of eyes AND not previously evaluated ? NO  Sudden vision change associated with new difficulty speaking/swallowing; using an  arm/leg; or new one-sided weakness (face drooping) ? NO  Chronic drooping of upper eyelid affecting vision AND not previously evaluated ? NO  Cut on or near eyelid OR near tear duct (corner of eye closest to nose) ? NO  Cuts, abrasions or puncture wound on face ? NO  Physician Instructions:  Care Advice:  "

## 2017-04-12 NOTE — NURSING NOTE
Patient presents with:  Post Op (Ophthalmology) Both Eyes: Both upper eyelid blepharoplasty and ptosis repair, both lower eyelid ectropion 4/10/17      Referring Provider:  No referring provider defined for this encounter.    HPI    Affected eye(s):  Left   Location:  Lateral   Symptoms:        Duration:  1 day   Frequency:  Intermittent       Do you have eye pain now?:  Yes   Location:  OS   Pain Level:  Moderate Pain (4)   Pain Frequency:  Intermittent   Pain Characteristics:  Stabbing   Other:  when she blinks

## 2017-04-12 NOTE — TELEPHONE ENCOUNTER
Jayna called the nurse triage line 04/12/2017 @ 0406. She has questions about follow up care after her eye surgery 04/10/2017. She also states her left eye started bleeding yesterday, about 1900, for approx. 10 minutes. She then noticed a long clot, which she pulled out. No additional bleeding after that. Denies vision changes. She would like to know if this is expected, or if she needs to be seen. Please call her at [phone]. Thank you.     Laurence Queen RN  FNA    Routed to: Dr. Oglesby and CAYETANO betancur

## 2017-04-26 ENCOUNTER — OFFICE VISIT (OUTPATIENT)
Dept: OPHTHALMOLOGY | Facility: CLINIC | Age: 77
End: 2017-04-26
Payer: COMMERCIAL

## 2017-04-26 DIAGNOSIS — H02.422 MYOGENIC PTOSIS, LEFT: ICD-10-CM

## 2017-04-26 DIAGNOSIS — H02.105 ECTROPION OF LEFT LOWER EYELID: ICD-10-CM

## 2017-04-26 DIAGNOSIS — H02.831 DERMATOCHALASIS OF BOTH UPPER EYELIDS: Primary | ICD-10-CM

## 2017-04-26 DIAGNOSIS — H02.102 ECTROPION OF RIGHT LOWER EYELID: ICD-10-CM

## 2017-04-26 DIAGNOSIS — H02.834 DERMATOCHALASIS OF BOTH UPPER EYELIDS: Primary | ICD-10-CM

## 2017-04-26 DIAGNOSIS — H02.421 MYOGENIC PTOSIS, RIGHT: ICD-10-CM

## 2017-04-26 PROCEDURE — 99024 POSTOP FOLLOW-UP VISIT: CPT | Performed by: OPHTHALMOLOGY

## 2017-04-26 ASSESSMENT — TONOMETRY
OS_IOP_MMHG: 8
IOP_METHOD: ICARE
OD_IOP_MMHG: 11

## 2017-04-26 ASSESSMENT — VISUAL ACUITY
OS_CC: 20/25
METHOD: SNELLEN - LINEAR
OD_CC: 20/25

## 2017-04-26 NOTE — MR AVS SNAPSHOT
After Visit Summary   2017    Jayna Gutierrez    MRN: [id number]           Patient Information     Date Of Birth          1940        Visit Information        Provider Department      2017 12:30 PM Poornima Oglesby MD [facility]         Follow-ups after your visit        Your next 10 appointments already scheduled     2017 11:00 AM CDT   Return Visit with Poornima Oglesby MD   [facility] ([facility])    [id number] [place] [address] [phone]              Who to contact     If you have questions or need follow up information about today's clinic visit or your schedule please contact [facility] directly at [phone].  Normal or non-critical lab and imaging results will be communicated to you by MyChart, letter or phone within 4 business days after the clinic has received the results. If you do not hear from us within 7 days, please contact the clinic through MyChart or phone. If you have a critical or abnormal lab result, we will notify you by phone as soon as possible.  Submit refill requests through [employer] or call your pharmacy and they will forward the refill request to us. Please allow 3 business days for your refill to be completed.          Additional Information About Your Visit        [employer] System[employer] Information     [employer] is an electronic gateway that provides easy, online access to your medical records. With [employer], you can request a clinic appointment, read your test results, renew a prescription or communicate with your care team.     To sign up for [employer] visit the website at [email or web address]   You will be asked to enter the access code listed below, as well as some personal information. Please follow the directions to create your username and password.     Your access code is: [email or web address]  Expires: 2017 11:58 AM     Your access code will  in 90  days. If you need help or a new code, please contact your [facility] or call [phone] for assistance.        Care EveryWhere ID     This is your Care EveryWhere ID. This could be used by other organizations to access your [facility] medical records  [id number]         Blood Pressure from Last 3 Encounters:   04/10/17 149/64    Weight from Last 3 Encounters:   04/06/17 47.6 kg (105 lb)              Today, you had the following     No orders found for display       Primary Care Provider    None Specified       No primary provider on file.        Thank you!     Thank you for choosing [facility]  for your care. Our goal is always to provide you with excellent care. Hearing back from our patients is one way we can continue to improve our services. Please take a few minutes to complete the written survey that you may receive in the mail after your visit with us. Thank you!             Your Updated Medication List - Protect others around you: Learn how to safely use, store and throw away your medicines at www.disposemymeds.org.          This list is accurate as of: 4/26/17 12:42 PM.  Always use your most recent med list.                   Brand Name Dispense Instructions for use    aspirin 81 MG tablet      Take 81 mg by mouth daily       CALCIUM PO      Take 1 tablet by mouth daily       COLACE PO      Take 50 mg by mouth as needed for constipation       erythromycin ophthalmic ointment    ROMYCIN    3.5 g    Apply small amount to incision sites three times a day       HYDROcodone-acetaminophen 5-325 MG per tablet    NORCO    15 tablet    Take 1 tablet by mouth every 6 hours as needed for moderate to severe pain       IBANdronate 150 MG tablet    BONIVA     Take 150 mg by mouth every 30 days       neomycin-polymyxin-dexamethasone 3.5-90027-6.1 Susp ophthalmic susp    MAXITROL    1 Bottle    One drop to operated eye(s) three times daily for 10 days.       OCUVITE PO       Take 1 capsule by mouth daily       omeprazole 20 MG CR capsule    priLOSEC     Take 1 capsule by mouth daily       prednisoLONE acetate 1 % ophthalmic susp    PRED FORTE     Place 1 drop into both eyes 4 times daily       REFRESH OPTIVE 1-0.9 % Gel   Generic drug:  Carboxymethylcellul-Glycerin      Apply 1 drop to eye At Bedtime       traMADol 50 MG tablet    ULTRAM    20 tablet    Take 1 tablet (50 mg) by mouth every 8 hours as needed for moderate pain       VITAMIN C PO          VITAMIN D PO      Take 1 tablet by mouth daily       XIIDRA 5 % Soln   Generic drug:  Lifitegrast      Apply 1 drop to eye 2 times daily

## 2017-04-26 NOTE — PROGRESS NOTES
"Doing very well post bilateral upper lids blepharoplasty and ptosis repair (ROLAND), both lower eyelid ectropion repair.  Notes eyes are more injected, but happy with appearance.    On exam excellent symmetry, and height MRD1: 5 both eyes. 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  Conj trace injection laterally left eye.   Cornea inferior punctate epithelial erosions 1+ right eye mild 1-2+ left eye.     Nava  Warm soaks  Continue xiidra  Increase systane to four times a day   Can use 1/2\" strip of nava at bedtime  Dry eye symptom should improve as orbicularis function returns.    F/u 2 months.     Complete documentation of historical and exam elements from today's encounter can be found in the full encounter summary report (not reduplicated in this progress note). I personally obtained the chief complaint(s) and history of present illness.  I confirmed and edited as necessary the review of systems, past medical/surgical history, family history, social history, and examination findings as documented by others; and I examined the patient myself. I personally reviewed the relevant tests, images, and reports as documented above. I formulated and edited as necessary the assessment and plan and discussed the findings and management plan with the patient and family. - Poornima Oglesby MD      "

## 2017-04-26 NOTE — LETTER
" 2017         RE:  :  MRN: Jayna Gutierrez  1940  [id number]     Dear Dr. Hernandez,    Your patient, Jayna Gutierrez, returned for oculoplastic follow up. My assessment and plan are below.  For further details, please see my attached clinic note.      Doing very well post bilateral upper lids blepharoplasty and ptosis repair (ROLAND), both lower eyelid ectropion repair.  Notes eyes are more injected, but happy with appearance.    On exam excellent symmetry, and height MRD1: 5 both eyes. 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  Conj trace injection laterally left eye.   Cornea inferior punctate epithelial erosions 1+ right eye mild 1-2+ left eye.     Nava  Warm soaks  Continue xiidra  Increase systane to four times a day   Can use 1/2\" strip of nava at bedtime  Dry eye symptom should improve as orbicularis function returns.    F/u 2 months.        Again, thank you for allowing me to participate in the care of your patient.      Sincerely,    Poornima Oglesby MD  Department of Ophthalmology and Visual Neurosciences  [facility]      CC: Laurence Hernandez  [facility]  [address] [facility] [address]  VIA Mail         "

## 2017-05-01 ENCOUNTER — TELEPHONE (OUTPATIENT)
Dept: OPHTHALMOLOGY | Facility: CLINIC | Age: 77
End: 2017-05-01

## 2017-05-01 NOTE — TELEPHONE ENCOUNTER
[facility] Call Center    Phone Message    Name of Caller: Jayna    Phone Number: Home number on file [phone] (home)    Best time to return call: any    May a detailed message be left on voicemail: yes    Relation to patient: Self    Reason for Call: Other: Patient had recent procedure by Dr Oglesby.  States she wants the stitches out, they are irritating her.  Can a care team member giver her a call to discuss please.      Action Taken: Message routed to:  Adult Clinics: Eye p [phone]

## 2017-05-01 NOTE — TELEPHONE ENCOUNTER
"Phone call to pt who states stitches in left eye are \"very tight and are pulling\".  Would like to have them removed if possible.  Appointment for suture check scheduled for 5/3/17.  Ginny Lane RN  "

## 2017-05-03 ENCOUNTER — OFFICE VISIT (OUTPATIENT)
Dept: OPHTHALMOLOGY | Facility: CLINIC | Age: 77
End: 2017-05-03
Payer: COMMERCIAL

## 2017-05-03 DIAGNOSIS — H02.831 DERMATOCHALASIS OF BOTH UPPER EYELIDS: ICD-10-CM

## 2017-05-03 DIAGNOSIS — H02.422 MYOGENIC PTOSIS, LEFT: ICD-10-CM

## 2017-05-03 DIAGNOSIS — H02.834 DERMATOCHALASIS OF BOTH UPPER EYELIDS: ICD-10-CM

## 2017-05-03 DIAGNOSIS — H02.421 MYOGENIC PTOSIS, RIGHT: ICD-10-CM

## 2017-05-03 DIAGNOSIS — Z98.890 POSTOPERATIVE EYE STATE: Primary | ICD-10-CM

## 2017-05-03 PROCEDURE — 99024 POSTOP FOLLOW-UP VISIT: CPT | Performed by: OPHTHALMOLOGY

## 2017-05-03 RX ORDER — NEOMYCIN SULFATE, POLYMYXIN B SULFATE AND DEXAMETHASONE 3.5; 10000; 1 MG/ML; [USP'U]/ML; MG/ML
1 SUSPENSION/ DROPS OPHTHALMIC 3 TIMES DAILY
Qty: 1.5 ML | Refills: 0 | Status: SHIPPED | OUTPATIENT
Start: 2017-05-03 | End: 2017-05-13

## 2017-05-03 NOTE — NURSING NOTE
Patient presents with:  Post Op (Ophthalmology) Both Eyes: Both upper eyelid blepharoplasty and ptosis repair, both lower eyelid ectropion 4/10/17  Wound Check: right side lateral feels tight and swells at night worse than the left, still using ointment, gtts, and ice and warm packs, would like sutures removed      Referring Provider:  No referring provider defined for this encounter.    HPI    Affected eye(s):  Right   Location:  Lateral   Symptoms:     No foreign body sensation   No tearing   No eye discharge         Do you have eye pain now?:  No

## 2017-05-03 NOTE — PROGRESS NOTES
Doing well post both upper eyelid blepharoplasty and ptosis repair. Notes tenderness at right lateral canthus. Not Foreign body sensation, but more of an ache.    One exam well healing incisions bilaterally . Lower lid in good position. Good lower lid tone bilaterally.   margin to reflex distance 1: 5 both eyes, 1 mm of lagophthalmos both eyes.   Cornea clear few inf punctate epithelial erosions right eye.     Doing well, and looks great. I reassured her it is common to have lateral canthal tenderness after tarsal strip procedure - as we are securing the lower lid to the periostium.    Continue:  Rain to incisions  Warm soaks  Continue xiidra  Systane to four times a day     Add:  1 drop maxitrol right eye three times a day for 10 days.  F/u with me as scheduled in June, sooner as needed.     Complete documentation of historical and exam elements from today's encounter can be found in the full encounter summary report (not reduplicated in this progress note). I personally obtained the chief complaint(s) and history of present illness.  I confirmed and edited as necessary the review of systems, past medical/surgical history, family history, social history, and examination findings as documented by others; and I examined the patient myself. I personally reviewed the relevant tests, images, and reports as documented above. I formulated and edited as necessary the assessment and plan and discussed the findings and management plan with the patient and family. - Poornima Oglesby MD

## 2017-06-28 ENCOUNTER — OFFICE VISIT (OUTPATIENT)
Dept: OPHTHALMOLOGY | Facility: CLINIC | Age: 77
End: 2017-06-28
Payer: COMMERCIAL

## 2017-06-28 DIAGNOSIS — H02.132 SENILE ECTROPION OF RIGHT LOWER EYELID: ICD-10-CM

## 2017-06-28 DIAGNOSIS — H02.831 DERMATOCHALASIS OF BOTH UPPER EYELIDS: Primary | ICD-10-CM

## 2017-06-28 DIAGNOSIS — H02.834 DERMATOCHALASIS OF BOTH UPPER EYELIDS: Primary | ICD-10-CM

## 2017-06-28 DIAGNOSIS — H02.135 SENILE ECTROPION OF LEFT LOWER EYELID: ICD-10-CM

## 2017-06-28 DIAGNOSIS — H02.422 MYOGENIC PTOSIS, LEFT: ICD-10-CM

## 2017-06-28 DIAGNOSIS — H02.421 MYOGENIC PTOSIS, RIGHT: ICD-10-CM

## 2017-06-28 PROCEDURE — 99024 POSTOP FOLLOW-UP VISIT: CPT | Performed by: OPHTHALMOLOGY

## 2017-06-28 ASSESSMENT — VISUAL ACUITY
OD_CC: 20/20
CORRECTION_TYPE: GLASSES
OS_CC: 20/20
METHOD: SNELLEN - LINEAR
OD_CC+: -1

## 2017-06-28 NOTE — PROGRESS NOTES
Jayna Gutierrez is about 2 months status post both upper eyelid blepharoplasty , bilateral levator advancement ptosis repair and bilateral ectropion repair .  She is doing well. She had dry eye before surgery and she feels her symptoms are unchanged. She is happy with the appearance.     She does have a small cheek lesion we had previously noted. She had her primary care doctor look at the lesion and they tried to freeze it off but it recurred. It has been present for several years and is very gradually enlarging. No pain or bleeding. No history of skin cancer. Also lateral canthal area of left lower eyelid there is a small pyogenic granuloma, she feels it does not bother her.    I will have her return for excisional biopsy of the right cheek lesion. At this point she is not bothered by the left lower lid granuloma, but if she does become symptomatic this can be excised as well.     Complete documentation of historical and exam elements from today's encounter can be found in the full encounter summary report (not reduplicated in this progress note). I personally obtained the chief complaint(s) and history of present illness.  I confirmed and edited as necessary the review of systems, past medical/surgical history, family history, social history, and examination findings as documented by others; and I examined the patient myself. I personally reviewed the relevant tests, images, and reports as documented above. I formulated and edited as necessary the assessment and plan and discussed the findings and management plan with the patient and family. - Poornima Oglesby MD

## 2017-06-28 NOTE — NURSING NOTE
Patient presents with:  Post Op (Ophthalmology) Both Eyes:  Both upper eyelid blepharoplasty and ptosis repair, both lower eyelid ectropion 4/10/17      Referring Provider:  No referring provider defined for this encounter.    HPI    Affected eye(s):  Both   Symptoms:     Eye discharge      Frequency:  Daily       Do you have eye pain now?:  No      Comments:  Pt denies any Gtts or oint.  Every morning ou mattery.  Occasional pain OS.

## 2017-06-28 NOTE — MR AVS SNAPSHOT
After Visit Summary   2017    Jayna Gutierrez    MRN: [id number]           Patient Information     Date Of Birth          1940        Visit Information        Provider Department      2017 11:00 AM Poornima Oglesby MD [facility]         Follow-ups after your visit        Your next 10 appointments already scheduled     2017 12:30 PM CDT   Return Visit with Poornima Oglesby MD   [facility] ([facility]    [address] [place] [address] [phone]              Who to contact     If you have questions or need follow up information about today's clinic visit or your schedule please contact [facility] directly at [phone].  Normal or non-critical lab and imaging results will be communicated to you by MyChart, letter or phone within 4 business days after the clinic has received the results. If you do not hear from us within 7 days, please contact the clinic through MyChart or phone. If you have a critical or abnormal lab result, we will notify you by phone as soon as possible.  Submit refill requests through [employer] or call your pharmacy and they will forward the refill request to us. Please allow 3 business days for your refill to be completed.          Additional Information About Your Visit        Kona[employer]hart Information     [employer] is an electronic gateway that provides easy, online access to your medical records. With [employer], you can request a clinic appointment, read your test results, renew a prescription or communicate with your care team.     To sign up for [employer] visit the website at [email or web address]   You will be asked to enter the access code listed below, as well as some personal information. Please follow the directions to create your username and password.     Your access code is: [email or web address]  Expires: 2017 11:14 AM     Your access code will  in 90  days. If you need help or a new code, please contact your [facility] or call [phone] for assistance.        Care EveryWhere ID     This is your Care EveryWhere ID. This could be used by other organizations to access your [place] medical records  [id number]         Blood Pressure from Last 3 Encounters:   04/10/17 149/64    Weight from Last 3 Encounters:   04/06/17 47.6 kg (105 lb)              Today, you had the following     No orders found for display       Primary Care Provider    None Specified       No primary provider on file.        Equal Access to Services     [facility] Hadii charli newman hadarvin Sonida, waaxda luqadaha, qaybta kaalmajoseph mendez, seble durham . So [facility] [phone].    ATENCIÓN: Si habla español, tiene a sommer disposición servicios gratuitos de asistencia lingüística. Llame al [phone].    We comply with applicable federal civil rights laws and [place] laws. We do not discriminate on the basis of race, color, national origin, age, disability sex, sexual orientation or gender identity.            Thank you!     Thank you for choosing [facility]  for your care. Our goal is always to provide you with excellent care. Hearing back from our patients is one way we can continue to improve our services. Please take a few minutes to complete the written survey that you may receive in the mail after your visit with us. Thank you!             Your Updated Medication List - Protect others around you: Learn how to safely use, store and throw away your medicines at www.disposemymeds.org.          This list is accurate as of: 6/28/17 11:14 AM.  Always use your most recent med list.                   Brand Name Dispense Instructions for use Diagnosis    aspirin 81 MG tablet      Take 81 mg by mouth daily        CALCIUM PO      Take 1 tablet by mouth daily        COLACE PO      Take 50 mg by mouth as needed for  constipation        IBANdronate 150 MG tablet    BONIVA     Take 150 mg by mouth every 30 days        OCUVITE PO      Take 1 capsule by mouth daily        omeprazole 20 MG CR capsule    priLOSEC     Take 1 capsule by mouth daily        REFRESH OPTIVE 1-0.9 % Gel   Generic drug:  Carboxymethylcellul-Glycerin      Apply 1 drop to eye At Bedtime        traMADol 50 MG tablet    ULTRAM    20 tablet    Take 1 tablet (50 mg) by mouth every 8 hours as needed for moderate pain    Postoperative eye state       VITAMIN C PO           VITAMIN D PO      Take 1 tablet by mouth daily        XIIDRA 5 % Soln   Generic drug:  Lifitegrast      Apply 1 drop to eye 2 times daily

## 2017-07-12 ENCOUNTER — OFFICE VISIT (OUTPATIENT)
Dept: OPHTHALMOLOGY | Facility: CLINIC | Age: 77
End: 2017-07-12
Payer: COMMERCIAL

## 2017-07-12 DIAGNOSIS — H02.9 LESION OF EYELID: ICD-10-CM

## 2017-07-12 DIAGNOSIS — L98.9 LESION OF SKIN OF FACE: Primary | ICD-10-CM

## 2017-07-12 PROCEDURE — 67840 REMOVE EYELID LESION: CPT | Performed by: OPHTHALMOLOGY

## 2017-07-12 PROCEDURE — 11442 EXC FACE-MM B9+MARG 1.1-2 CM: CPT | Performed by: OPHTHALMOLOGY

## 2017-07-12 RX ORDER — CYCLOSPORINE 0.5 MG/ML
EMULSION OPHTHALMIC 2 TIMES DAILY
COMMUNITY
Start: 2017-07-06

## 2017-07-12 ASSESSMENT — VISUAL ACUITY
OS_CC: 20/20
METHOD: SNELLEN - LINEAR
OS_CC+: -1
CORRECTION_TYPE: GLASSES
OD_CC: 20/20

## 2017-07-12 NOTE — PROGRESS NOTES
Enlarging lesion on right cheek and new left lower lid margin lesion.  Possibly Basal cell carcinoma right cheek, left lower eyelid ? Reaction to tissue - clinically does not look like pyogenic granuloma.     Again discussed r/b/a to excisional biopsy and possibility of requiring Mohs if returns as Basal cell carcinoma. She elected to proceed.        Operative Note - Eyelid Biopsy      Jul 12, 2017    Pre-operative Diagnosis: Lesion right eye cheek, lesion left lower eyelid    Post-operative Diagnosis: Same.    Procedure: Excision of eyelid neoplasms.    Surgeon: Poornima Oglesby MD    Assistant: none    Anesthesia: Local infiltration with 2% Lidocaine and Epinephrine.    Complications: None.    Estimated blood loss: <5 mL    Specimen: Eyelid neoplasm to pathology - right cheek, left lower eyelid margin.    Procedure:     The patient was brought to the minor procedure room and placed supine on the operating table.  The involved left lower eyelid and right cheek area was infiltrated with local anesthetic.  The area was prepped and draped in the typical sterile fashion.  Beginning with the left lower eyelid lesion, a tooth forceps was used to elevate the lesion and it was excised at its base with a Winston scissors.  Hemostasis was obtained with a high temperature cautery.  The excised diameter measured 4 mm, the wound was left to granulate in.     Next attention was directed to the right cheek lesion. An ellipse was marked out within the skin relaxed tension lines encompassing the entire lesion. A #15 Bard-Yousuf blade was used to incise in the region of the previously marked ellipse. Omar scissors were used to excise the base of the lesion. High temperature cautery was used to obtain hemostasis. Interrupted 5-0 plain gut sutures were used to close. The diameter of the excision was 20 mm.     Dressing: The wound was dressed with Erythromycin.    Disposition: The patient left the minor procedure room in stable  condition.    Poornima Oglesby MD  Oculoplastic and Orbital Surgery   Department of Ophthalmology and Visual Neurosciences  [facility]    Complete documentation of historical and exam elements from today's encounter can be found in the full encounter summary report (not reduplicated in this progress note). I personally obtained the chief complaint(s) and history of present illness.  I confirmed and edited as necessary the review of systems, past medical/surgical history, family history, social history, and examination findings as documented by others; and I examined the patient myself. I personally reviewed the relevant tests, images, and reports as documented above. I formulated and edited as necessary the assessment and plan and discussed the findings and management plan with the patient and family. - Poornima Oglesby MD

## 2017-07-12 NOTE — NURSING NOTE
Patient presents with:  Lesion On Left Lower Lid:   Removal of lump left LL.  Procedure: Biopsy of lesion right check      Referring Provider:  No referring provider defined for this encounter.    HPI    Symptoms:           Do you have eye pain now?:  No

## 2017-07-12 NOTE — LETTER
"July 20, 2017      Juan Logan  [address] [address] [place] [address]        Dear Juan Foster,    Please see below for your test results. Thankfully the lesion is consistent with sebaceous hyperplasia which is benign (not concerning). As such no further procedures are necessary.     Resulted Orders   Surgical pathology exam   Result Value Ref Range    Copath Report       Patient Name: JUAN LOGAN  MR#: [id number]  Specimen #: [id number]  Collected: 7/12/2017  Received: 7/13/2017  Reported: 7/20/2017 11:57  Ordering Phy(s): JASWANT VALDERRAMA    For improved result formatting, select 'View Enhanced Report Format'  under Linked Documents section.    SPECIMEN(S):  A: Skin, right cheek  B: Skin, left lower eyelid    FINAL DIAGNOSIS:  A. Cheek, right, excisional biopsy: Sebaceous hyperplasia.    B. Lower eyelid, left, excisional biopsy: Multiple epithelioid (keratin)  cysts with foreign body granulomatous response suggestive of cyst  rupture or leakage.    I have personally reviewed all specimens and or slides, including the  listed special stains, and used them with my medical judgement to  determine the final diagnosis.    Electronically signed out by:    Leilani Herron M.D., [facility]    CLINICAL HISTORY:  The patient is a 77 year old female with an enlarging lesion of the  right cheek and new left lower eyelid margin lesion. Clinical exam is  conc erning for basal cell carcinoma. She undergoes excisional biopsy of  the right cheek and left lower eyelid.    GROSS:  A:  The specimen is received in formalin with proper patient  identification, labeled \"R. Cheek\" and the specimen consists of a  single, irregular skin nodule measuring 0.6 x 0.4 cm.  The skin surface  displays diffuse nodularity.  The resection margin is inked black.  It  is trisected and entirely submitted in cassette A1.    B:  The specimen is received in formalin with proper patient  identification, labeled \"L. Lower eyelid\".  The " specimen consists of a  0.4 x 0.2 x 0.2 cm well encapsulated portion of yellow tan soft tissue.  The external surface is inked black.  The specimen is wrapped and  entirely submitted in cassette B1. (Dictated by: Vanna Cisse [facility]  7/13/2017 10:53 AM)    MICROSCOPIC:  A. The tissue consists of skin with unremarkable epidermis. The dermis  demonstrates lobules of enlarged, mature sebaceous gland. The gland  lobules consist of normal-appea ring sebocytes and the gland architecture  is otherwise normal. There is marked elastotic degeneration in the  surrounding dermis.    B. The tissue consists of conjunctiva with multiple cystic spaces in the  stroma. The cysts are lined by stratified squamous epithelium, some  keratinizing. The lumen contain keratin. An infiltrate of epithelioid  histiocytes and lymphocytes is present in the dermis adjacent to the  cysts.    CPT Codes:  A: 64054-CC0  B: 89079-OD4    TESTING LAB LOCATION:  [facility], [address]   [address] [phone]    COLLECTION SITE:  Client: [facility]  Location: [facility] ()         If you have any questions, please call the clinic.    Sincerely,    Poornima Oglesby MD    [facility]  [address] [place] [address]  Eye clinic: [phone]  Appointment scheduling: [phone]

## 2017-07-12 NOTE — MR AVS SNAPSHOT
After Visit Summary   2017    Jayna Gutierrez    MRN: [id number]           Patient Information     Date Of Birth          1940        Visit Information        Provider Department      2017 12:30 PM Poornima Oglesby MD [facility]         Follow-ups after your visit        Your next 10 appointments already scheduled     2017  9:00 AM CDT   Return Visit with Poornima Oglesby MD   [facility] ([facility])    [address] [place] [address] [phone]              Who to contact     If you have questions or need follow up information about today's clinic visit or your schedule please contact [facility] directly at [phone].  Normal or non-critical lab and imaging results will be communicated to you by MyChart, letter or phone within 4 business days after the clinic has received the results. If you do not hear from us within 7 days, please contact the clinic through MyChart or phone. If you have a critical or abnormal lab result, we will notify you by phone as soon as possible.  Submit refill requests through [email or web address] or call your pharmacy and they will forward the refill request to us. Please allow 3 business days for your refill to be completed.          Additional Information About Your Visit        [employer] Information     [employer] is an electronic gateway that provides easy, online access to your medical records. With [employer], you can request a clinic appointment, read your test results, renew a prescription or communicate with your care team.     To sign up for [email or web address] visit the website at [email or web address]   You will be asked to enter the access code listed below, as well as some personal information. Please follow the directions to create your username and password.     Your access code is: [email or web address]  Expires: 2017 11:14 AM     Your access code will  in 90  days. If you need help or a new code, please contact your [facility] or call [phone] for assistance.        Care EveryWhere ID     This is your Care EveryWhere ID. This could be used by other organizations to access your [place] medical records  [id number]         Blood Pressure from Last 3 Encounters:   04/10/17 149/64    Weight from Last 3 Encounters:   04/06/17 47.6 kg (105 lb)              Today, you had the following     No orders found for display       Primary Care Provider    None Specified       No primary provider on file.        Equal Access to Services     [facility]: Hadii charli newman hadarvin Sonida, waaxda luqadaha, qaybta kaalmajoseph mendez, seble durham . So [facility] [phone].    ATENCIÓN: Si habla español, tiene a sommer disposición servicios gratuitos de asistencia lingüística. Llame al [phone].    We comply with applicable federal civil rights laws and [place] laws. We do not discriminate on the basis of race, color, national origin, age, disability sex, sexual orientation or gender identity.            Thank you!     Thank you for choosing [facility]  for your care. Our goal is always to provide you with excellent care. Hearing back from our patients is one way we can continue to improve our services. Please take a few minutes to complete the written survey that you may receive in the mail after your visit with us. Thank you!             Your Updated Medication List - Protect others around you: Learn how to safely use, store and throw away your medicines at www.disposemymeds.org.          This list is accurate as of: 7/12/17  1:09 PM.  Always use your most recent med list.                   Brand Name Dispense Instructions for use Diagnosis    aspirin 81 MG tablet      Take 81 mg by mouth daily        CALCIUM PO      Take 1 tablet by mouth daily        COLACE PO      Take 50 mg by mouth as needed for  constipation        IBANdronate 150 MG tablet    BONIVA     Take 150 mg by mouth every 30 days        OCUVITE PO      Take 1 capsule by mouth daily        omeprazole 20 MG CR capsule    priLOSEC     Take 1 capsule by mouth daily        REFRESH OPTIVE 1-0.9 % Gel   Generic drug:  Carboxymethylcellul-Glycerin      Apply 1 drop to eye At Bedtime        RESTASIS 0.05 % ophthalmic emulsion   Generic drug:  cycloSPORINE      2 times daily        traMADol 50 MG tablet    ULTRAM    20 tablet    Take 1 tablet (50 mg) by mouth every 8 hours as needed for moderate pain    Postoperative eye state       VITAMIN C PO           VITAMIN D PO      Take 1 tablet by mouth daily        XIIDRA 5 % Soln   Generic drug:  Lifitegrast      Apply 1 drop to eye 2 times daily

## 2017-07-20 LAB — COPATH REPORT: NORMAL

## 2017-07-26 ENCOUNTER — OFFICE VISIT (OUTPATIENT)
Dept: OPHTHALMOLOGY | Facility: CLINIC | Age: 77
End: 2017-07-26
Payer: COMMERCIAL

## 2017-07-26 DIAGNOSIS — L98.9 LESION OF SKIN OF FACE: Primary | ICD-10-CM

## 2017-07-26 DIAGNOSIS — H02.421 MYOGENIC PTOSIS, RIGHT: ICD-10-CM

## 2017-07-26 DIAGNOSIS — H02.135 SENILE ECTROPION OF LEFT LOWER EYELID: ICD-10-CM

## 2017-07-26 DIAGNOSIS — H02.132 SENILE ECTROPION OF RIGHT LOWER EYELID: ICD-10-CM

## 2017-07-26 DIAGNOSIS — H02.422 MYOGENIC PTOSIS, LEFT: ICD-10-CM

## 2017-07-26 PROCEDURE — 99212 OFFICE O/P EST SF 10 MIN: CPT | Performed by: OPHTHALMOLOGY

## 2017-07-26 ASSESSMENT — VISUAL ACUITY
METHOD: SNELLEN - LINEAR
OS_CC+: +2
OS_CC: 20/25
OD_CC: 20/25
CORRECTION_TYPE: GLASSES
OD_CC+: +2

## 2017-07-26 NOTE — MR AVS SNAPSHOT
After Visit Summary   2017    Jayna Gutierrez    MRN: [id number]           Patient Information     Date Of Birth          1940        Visit Information        Provider Department      2017 9:15 AM Poornima Oglesby MD [facility]        Today's Diagnoses     Lesion of skin of face    -  1    Myogenic ptosis, right        Myogenic ptosis, left        Senile ectropion of right lower eyelid        Senile ectropion of left lower eyelid           Follow-ups after your visit        Who to contact     If you have questions or need follow up information about today's clinic visit or your schedule please contact [facility] directly at [phone].  Normal or non-critical lab and imaging results will be communicated to you by MyChart, letter or phone within 4 business days after the clinic has received the results. If you do not hear from us within 7 days, please contact the clinic through MyChart or phone. If you have a critical or abnormal lab result, we will notify you by phone as soon as possible.  Submit refill requests through [employer] or call your pharmacy and they will forward the refill request to us. Please allow 3 business days for your refill to be completed.          Additional Information About Your Visit        MyChart Information     [employer] is an electronic gateway that provides easy, online access to your medical records. With [employer], you can request a clinic appointment, read your test results, renew a prescription or communicate with your care team.     To sign up for [employer] visit the website at [email or web address]   You will be asked to enter the access code listed below, as well as some personal information. Please follow the directions to create your username and password.     Your access code is: [email or web address]  Expires: 2017 11:14 AM     Your access code will  in 90 days. If you need help or a new code, please contact  your [facility] or call [phone] for assistance.        Care EveryWhere ID     This is your Care EveryWhere ID. This could be used by other organizations to access your [place] medical records  [id number]         Blood Pressure from Last 3 Encounters:   04/10/17 149/64    Weight from Last 3 Encounters:   04/06/17 47.6 kg (105 lb)              Today, you had the following     No orders found for display       Primary Care Provider    None Specified       No primary provider on file.        Equal Access to Services     MAR REYES : Hadii aad ku hadasho Soomaali, waaxda luqadaha, qaybta kaalmada adeegyada, waxay idiin hayvenusn lawson durham . So [facility] [phone].    ATENCIÓN: Si habla español, tiene a sommer disposición servicios gratuitos de asistencia lingüística. [facility] [phone].    We comply with applicable federal civil rights laws and [place] laws. We do not discriminate on the basis of race, color, national origin, age, disability sex, sexual orientation or gender identity.            Thank you!     Thank you for choosing [facility]  for your care. Our goal is always to provide you with excellent care. Hearing back from our patients is one way we can continue to improve our services. Please take a few minutes to complete the written survey that you may receive in the mail after your visit with us. Thank you!             Your Updated Medication List - Protect others around you: Learn how to safely use, store and throw away your medicines at www.disposemymeds.org.          This list is accurate as of: 7/26/17  9:30 AM.  Always use your most recent med list.                   Brand Name Dispense Instructions for use Diagnosis    aspirin 81 MG tablet      Take 81 mg by mouth daily        CALCIUM PO      Take 1 tablet by mouth daily        COLACE PO      Take 50 mg by mouth as needed for constipation        IBANdronate 150 MG tablet    BONIVA      Take 150 mg by mouth every 30 days        OCUVITE PO      Take 1 capsule by mouth daily        omeprazole 20 MG CR capsule    priLOSEC     Take 1 capsule by mouth daily        REFRESH OPTIVE 1-0.9 % Gel   Generic drug:  Carboxymethylcellul-Glycerin      Apply 1 drop to eye At Bedtime        RESTASIS 0.05 % ophthalmic emulsion   Generic drug:  cycloSPORINE      2 times daily        traMADol 50 MG tablet    ULTRAM    20 tablet    Take 1 tablet (50 mg) by mouth every 8 hours as needed for moderate pain    Postoperative eye state       VITAMIN C PO           VITAMIN D PO      Take 1 tablet by mouth daily        XIIDRA 5 % Soln   Generic drug:  Lifitegrast      Apply 1 drop to eye 2 times daily

## 2017-07-26 NOTE — PROGRESS NOTES
Chief Complaints and History of Present Illnesses   Patient presents with     RECHECK     Excision of eyelid neoplasms, Left lower lid and Right cheek 7/12/17     Jayna Gutierrez is doing a half months post postoperative upper plasty, bilaterally made advancement, bilateral lower eyelid ectropion repair. We biopsied 2 lesions 2 weeks ago. She is here to discuss the results. She feels she is healed well. The sutures have dissolved the most part and she is happy with the cosmesis.    Path:   A. Cheek, right, excisional biopsy: Sebaceous hyperplasia.     B. Lower eyelid, left, excisional biopsy: Multiple epithelioid (keratin)   cysts with foreign body granulomatous response suggestive of cyst   rupture or leakage.          Assessment & Plan     Jayna Gutierrez is a 77 year old female with the following diagnoses:   1. Lesion of skin of face    2. Myogenic ptosis, right    3. Myogenic ptosis, left    4. Senile ectropion of right lower eyelid    5. Senile ectropion of left lower eyelid       She is doing quite well and no longer symptomatic of upper eyelid or lower eyelid malposition. I will see her back on an as needed basis.              Complete documentation of historical and exam elements from today's encounter can be found in the full encounter summary report (not reduplicated in this progress note). I personally obtained the chief complaint(s) and history of present illness.  I confirmed and edited as necessary the review of systems, past medical/surgical history, family history, social history, and examination findings as documented by others; and I examined the patient myself. I personally reviewed the relevant tests, images, and reports as documented above. I formulated and edited as necessary the assessment and plan and discussed the findings and management plan with the patient and family. - Poornima Oglesby MD

## 2020-07-07 NOTE — TELEPHONE ENCOUNTER
Call Type: Triage Call    Presenting Problem: Jayna states she had eye surgery Monday, 2 days  ago. She says that only lthe eft eye, started bleeding yesterday  about 1900 (12 hours ago), bleed for about 10 minutes and then  stopped. She noticed there was a long blood clot, which she pulled  out. No additional bleeding after that. No vision changes. She has a  mild headache now. She is requesting a message sent to Dr. Poornima Oglesby. Advised to be seen in ER if bleeding starts again, or  with vision changes.  Triage Note:  Guideline Title: Postoperative Problems  Recommended Disposition: Call Provider within 72 Hours  Original Inclination: Wanted to speak with a nurse  Override Disposition:  Intended Action: Follow advice given  Physician Contacted: No  Evaluated by provider and has question/concern about their condition, treatment  plan, treatment options, follow-up appointments, or other follow-up care. ?  YES  Pain in shoulder or upper back following laparoscopic procedure ? NO  Signs of dehydration ? NO  Unconscious now ? NO  Abdominal bloating ? NO  New onset OR worsening bleeding from incision requiring pressure to control ? NO  Depression and no other symptoms ? NO  Severe breathing problems ? NO  Wound separation AND internal organs protrude through wound or surgical incision  (evisceration) ? NO  Hives /Urticaria /Rash ? NO  New or worsening signs and symptoms that may indicate shock ? NO  Neck lump/swelling ? NO  Coughing up large amount of obvious blood (not blood-streaked sputum) ? NO  Orthopedic hardware (metal plate, xiao or screw) newly bulging under or through  skin ? NO  New seizure now or within last 6 hours ? NO  Continuous or heavy bleeding from operative site and NOT controlled with 10  minutes of steady pressure ? NO  Productive cough AND new onset green, yellow, brown or bloody sputum ? NO  Pain not relieved by pain medication when taken as directed ? NO  Passing red, black or tarry  material from rectum AND onset of new signs and  symptoms of hypovolemia ? NO  Wearing cast or splint AND new or worsening pain, swelling, numbness, tingling,  coolness or change in color that is NOT improved by elevation for 30 minutes OR  not resolved within 2 hours ? NO  Temperature of 101.5 F (38.6 C) or greater that has not responded to 24 hours of  home care measures ? NO  Vomiting red, bloody or coffee-ground material, more than streaks of blood or  scant amount (not following nosebleed within past day) ? NO  New onset severe pain and pale, discolored or cool below the surgical site  compared to the other extremity ? NO  Current or recent urinary tract instrumentation AND urinary tract symptoms OR no  urine flow ? NO  New or worsening breathing problems ? NO  Heavy vaginal bleeding (soaking 1 pad/tampon every hour for 2 hours or more) ? NO  Urinary tract symptoms AND any flank or low back pain ? NO  Medical device (pump, infusion catheter) damaged or not functioning as expected  (leaking, not infusing, swelling at insertion site) ? NO  More bleeding from site of instrumentation or procedure OR bleeding lasting longer  than defined in provider specified discharge information ? NO  Has one or more urinary tract symptoms AND has not been previously evaluated ? NO  Chest discomfort associated with shortness of breath, sweating, odd heartbeats or  different heart rate, nausea, vomiting, lightheadedness, or fainting lasting 5 or  more minutes now or within the last hour ? NO  Chest pain spreading to the shoulders, neck, jaw, in one or both arms, stomach or  back lasting 5 or more minutes now or within the last hour. Pain is NOT  associated with taking a deep breath or a productive cough, movement, or touch to  a localized area. ? NO  Recent onset of pain, tenderness or aching in an extremity that may worsen with  standing or walking OR a cord-like swelling in an extremity that may feel warm,  look red or  discolored. ? NO  Signs and symptoms of anaphylaxis ? NO  Questions or concerns about postoperative wound ? NO  Headache following spinal anesthesia AND not relieved by pain medication ? NO  Pressure, fullness, squeezing sensation or pain anywhere in the chest lasting 5 or  more minutes now or within the last hour. Pain is NOT associated with taking a  deep breath or a productive cough, movement, or touch to a localized area on the  chest. ? NO  Persistent abdominal OR pelvic pain lasting longer than defined in provider  specified discharge information. ? NO  Persistent mild-moderate vaginal bleeding lasting longer than defined in provider  specified discharge information. ? NO  Sudden onset of focal neurological changes (difficulty speaking, numbness,  weakness, paralysis, loss of coordination, or change in vision such as double  vision or loss of visual field) ? NO  Sudden onset of shortness of breath, chest pain and cough with blood tinged sputum  ? NO  No urination for 12 or more hours ? NO  Abdominal pain, any blood in vomitus or stool, abdominal bloating, new fever or  other cautionary symptoms began after GI surgery or procedure and is lasting  longer than defined in provider specified discharge information. ? NO  Abdominal pain, any blood in vomitus or stool, abdominal bloating, new fever or  other cautionary symptoms began after GI surgery or procedure and is lasting  longer than defined in provider specified discharge information. ? NO  New onset of unbearable pain within last 24 hours ? NO  New onset of appetite loss that has lasted 7 days or more ? NO  Any temperature elevation in an immunocompromised individual OR frail elderly with  signs of dehydration ? NO  Unable to retain food or fluids for 24 hours or more due to recurrent vomiting ? NO  Vomiting within 48 hours after the procedure and is not relieved by provider  recommended treatment ? NO  Any other unexpected urinary symptoms following urinary  tract or abdominal surgery  within timeframe specified by provider ? NO  Physician Instructions:  Care Advice:   Hospitalist

## 2022-10-09 NOTE — LETTER
5/3/2017         RE:  :  MRN: Jayna Gutierrez  1940  [id number]     Dear Dr. Hernandez,    Jayna Gutierrez, returned for oculoplastic follow up. My assessment and plan are below.  For further details, please see my attached clinic note.      Doing well post both upper eyelid blepharoplasty and ptosis repair. Notes tenderness at right lateral canthus. Not Foreign body sensation, but more of an ache.    One exam well healing incisions bilaterally . Lower lid in good position. Good lower lid tone bilaterally.   margin to reflex distance 1: 5 both eyes, 1 mm of lagophthalmos both eyes.   Cornea clear few inf punctate epithelial erosions right eye.     Doing well, and looks great. I reassured her it is common to have lateral canthal tenderness after tarsal strip procedure - as we are securing the lower lid to the periostium.    Continue:  Rain to incisions  Warm soaks  Continue xiidra  Systane four times a day     Add:  1 drop maxitrol right eye three times a day for 10 days.  F/u with me as scheduled in , sooner as needed.        Again, thank you for allowing me to participate in the care of your patient.      Sincerely,    Poornima Oglesby MD  Department of Ophthalmology and Visual Neurosciences  [facility]      CC: Laurence Hernandez  [facility]  [address] [id number]  VIA Mail          Home

## 2024-09-03 NOTE — NURSING NOTE
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    HPI    Symptoms:              Comments:  Doing well.  Pt currently using an Oint TID OU and restasis BID OU.               
Never

## 2025-02-11 ENCOUNTER — MEDICAL CORRESPONDENCE (OUTPATIENT)
Dept: HEALTH INFORMATION MANAGEMENT | Facility: CLINIC | Age: 85
End: 2025-02-11

## 2025-02-11 ENCOUNTER — TRANSCRIBE ORDERS (OUTPATIENT)
Dept: OTHER | Age: 85
End: 2025-02-11

## 2025-02-11 DIAGNOSIS — R13.19 ESOPHAGEAL DYSPHAGIA: Primary | ICD-10-CM

## 2025-04-10 ENCOUNTER — TRANSCRIBE ORDERS (OUTPATIENT)
Dept: OTHER | Age: 85
End: 2025-04-10

## 2025-04-10 DIAGNOSIS — L29.9 PRURITIC CONDITION: Primary | ICD-10-CM

## (undated) DEVICE — MARKER SKIN DOUBLE TIP W/FLEXI-RULER W/LABELS

## (undated) DEVICE — GLOVE PROTEXIS W/NEU-THERA 7.0  2D73TE70

## (undated) DEVICE — PACK MINOR EYE

## (undated) DEVICE — ESU EYE HIGH TEMP 65410-183

## (undated) DEVICE — NDL 30GA 0.5" 305106

## (undated) DEVICE — SU MERSILENE 5-0 S-24 18" 1764G

## (undated) DEVICE — SOL WATER IRRIG 1000ML BOTTLE 07139-09

## (undated) DEVICE — BLADE KNIFE SURG 15 371115

## (undated) DEVICE — SU VICRYL 5-0 S-14 12" J553G

## (undated) DEVICE — GLOVE PROTEXIS W/NEU-THERA 6.0  2D73TE60

## (undated) DEVICE — SYR 03ML LL W/O NDL

## (undated) DEVICE — SU PLAIN 6-0 HE-1 18" G1770K

## (undated) DEVICE — GLOVE PROTEXIS W/NEU-THERA 7.5  2D73TE75